# Patient Record
Sex: FEMALE | Race: WHITE | Employment: OTHER | URBAN - NONMETROPOLITAN AREA
[De-identification: names, ages, dates, MRNs, and addresses within clinical notes are randomized per-mention and may not be internally consistent; named-entity substitution may affect disease eponyms.]

---

## 2020-07-07 ENCOUNTER — OFFICE VISIT (OUTPATIENT)
Dept: PODIATRY | Facility: OTHER | Age: 30
End: 2020-07-07
Attending: PODIATRIST
Payer: COMMERCIAL

## 2020-07-07 VITALS
WEIGHT: 235 LBS | HEIGHT: 68 IN | HEART RATE: 84 BPM | SYSTOLIC BLOOD PRESSURE: 122 MMHG | TEMPERATURE: 97.4 F | OXYGEN SATURATION: 97 % | BODY MASS INDEX: 35.61 KG/M2 | DIASTOLIC BLOOD PRESSURE: 82 MMHG

## 2020-07-07 DIAGNOSIS — L84 CALLUS OF FOOT: Primary | ICD-10-CM

## 2020-07-07 DIAGNOSIS — L85.3 XEROSIS OF SKIN: ICD-10-CM

## 2020-07-07 PROCEDURE — 99203 OFFICE O/P NEW LOW 30 MIN: CPT | Performed by: PODIATRIST

## 2020-07-07 RX ORDER — AMMONIUM LACTATE 12 G/100G
CREAM TOPICAL 2 TIMES DAILY
Qty: 385 G | Refills: 5 | Status: SHIPPED | OUTPATIENT
Start: 2020-07-07

## 2020-07-07 SDOH — HEALTH STABILITY: MENTAL HEALTH: HOW OFTEN DO YOU HAVE A DRINK CONTAINING ALCOHOL?: NEVER

## 2020-07-07 ASSESSMENT — MIFFLIN-ST. JEOR: SCORE: 1844.45

## 2020-07-07 ASSESSMENT — PAIN SCALES - GENERAL: PAINLEVEL: NO PAIN (0)

## 2020-07-07 NOTE — PATIENT INSTRUCTIONS
Calluses on the bottom surface of the foot will continue to come back due to the pressure from the bone on the bottom of your foot. Below are some tips for keeping the callus(es) trimmed which often decreases the pain on the bottom of your foot.    -Callus care:  ---Do a daily epsom salt soak in lukewarm water for 20 minutes.   ---After the soak, the apply a moisturizing cream (ammonium lactate) to the callus and let it soak in for about ten minutes  ---Next, take an beba board or nail file to or pumice stone the callus. This should be done daily (minimally lotion and callus paring) to keep the callus well pared.    -Ammonium Lactate will be ordered through your pharmacy today    -Eucerin cream to be used daily (possibly at night with socks over the Eucerin cream on the feet)  -Don't apply cream excessively between the toes

## 2020-07-07 NOTE — NURSING NOTE
"Chief Complaint   Patient presents with     Musculoskeletal Problem     Possible calluses on both feet.       Initial /82 (BP Location: Left arm, Patient Position: Sitting, Cuff Size: Adult Regular)   Pulse 84   Temp 97.4  F (36.3  C) (Tympanic)   Ht 1.727 m (5' 8\")   Wt 106.6 kg (235 lb)   SpO2 97%   BMI 35.73 kg/m   Estimated body mass index is 35.73 kg/m  as calculated from the following:    Height as of this encounter: 1.727 m (5' 8\").    Weight as of this encounter: 106.6 kg (235 lb).  Medication Reconciliation: complete  Nora López MA    "

## 2020-07-07 NOTE — PROGRESS NOTES
"Chief complaint: Patient presents with:  Musculoskeletal Problem: Possible calluses on both feet.        History of Present Illness: This 29 year old female is seen at the request of No ref. provider found for evaluation and suggestions of management of bilateral plantar forefoot and heel calluses. This has greatly worsened in the past year and has become more diffuse. They are only painful when the calluses crack open.  She had some cracking in the past, but this also has worsened in the past year.    She has tried Dr. Lindo's foot creams, she has tried epsom salt soaks, she has tried multiple callus creams, but nothing has helped. She uses these products daily. She normally showers at night and she also uses Dr. Lindo's scrub. She also sleeps with lotion and socks on her feet. Nobody else in her family has this dry skin or callusing of the feet.    No further pedal complaints today.     Patient does not use tobacco products.       /82 (BP Location: Left arm, Patient Position: Sitting, Cuff Size: Adult Regular)   Pulse 84   Temp 97.4  F (36.3  C) (Tympanic)   Ht 1.727 m (5' 8\")   Wt 106.6 kg (235 lb)   SpO2 97%   BMI 35.73 kg/m      There is no problem list on file for this patient.      No past surgical history on file.    Current Outpatient Medications   Medication     Prenatal Vit-Fe Fumarate-FA (PRENATAL VITAMINS PO)     No current facility-administered medications for this visit.         No Known Allergies    No family history on file.    Social History     Socioeconomic History     Marital status:      Spouse name: None     Number of children: None     Years of education: None     Highest education level: None   Occupational History     None   Social Needs     Financial resource strain: None     Food insecurity     Worry: None     Inability: None     Transportation needs     Medical: None     Non-medical: None   Tobacco Use     Smoking status: Never Smoker     Smokeless tobacco: Never Used "   Substance and Sexual Activity     Alcohol use: Never     Frequency: Never     Drug use: None     Sexual activity: None   Lifestyle     Physical activity     Days per week: None     Minutes per session: None     Stress: None   Relationships     Social connections     Talks on phone: None     Gets together: None     Attends [employer] service: None     Active member of club or organization: None     Attends meetings of clubs or organizations: None     Relationship status: None     Intimate partner violence     Fear of current or ex partner: None     Emotionally abused: None     Physically abused: None     Forced sexual activity: None   Other Topics Concern     None   Social History Narrative     None       ROS: 10 point ROS neg other than the symptoms noted above in the HPI.  EXAM  Constitutional: healthy, alert and no distress    Psychiatric: mentation appears normal and affect normal/bright    VASCULAR:  -Dorsalis pedis pulse +2/4 b/l  -Posterior tibial pulse +2/4 b/l  -Capillary refill time < 3 seconds to b/l hallux  NEURO:  -Light touch sensation intact to b/l plantar forefoot  DERM:  -Hyperkeratotic lesion to bilateral plantar 1st metatarsal head and bilateral heels  ---Diffuse hyperkeratotic lesions with pinching of skin calluses and mild opening of skin through the skin only on the LEFT plantar 1st metatarsal head  -Skin diffusely dry to bilateral plantar feet  -Mild rubor to bilateral heels, but minimal rubor to the remaining foot  MSK:  -Pain on palpation to open fissure on LEFT foot medial 1st MTPJ  -Muscle strength of ankles +5/5 for dorsiflexion, plantarflexion, ABDUction and ADDuction b/l    ============================================================    ASSESSMENT:  (L84) Callus of foot  (primary encounter diagnosis)    (L85.3) Xerosis of skin      PLAN:  -Patient evaluated and examined. Treatment options discussed with no educational barriers noted.    -Callus care:  ---Do a daily epsom salt soak in  lukewarm water for 20 minutes.   ---After the soak, the apply a moisturizing cream (ammonium lactate) to the callus and let it soak in for about ten minutes  ---Next, take an beba board or nail file to or pumice stone the callus. This should be done daily (minimally lotion and callus paring) to keep the callus well pared.    -Ammonium Lactate ordered today with three refills    -Eucerin cream to be used daily (possibly at night with socks over the Eucerin cream on the feet)  -Don't apply cream excessively between the toes  -Continue daily with callus tool of patient's, pumice stone / beba board to keep pinching of skin minimal  -Patient in agreement with the above treatment plan and all of patient's questions were answered.      RTC one month to evaluate callusing of skin  Will consider switching to urea cream if ammonium lactate is not effectively reducing the calluses        Evelin Jama DPM